# Patient Record
Sex: MALE | Race: WHITE | NOT HISPANIC OR LATINO | URBAN - METROPOLITAN AREA
[De-identification: names, ages, dates, MRNs, and addresses within clinical notes are randomized per-mention and may not be internally consistent; named-entity substitution may affect disease eponyms.]

---

## 2021-05-11 ENCOUNTER — EMERGENCY (EMERGENCY)
Facility: HOSPITAL | Age: 46
LOS: 0 days | Discharge: HOME | End: 2021-05-11
Attending: EMERGENCY MEDICINE
Payer: COMMERCIAL

## 2021-05-11 VITALS
OXYGEN SATURATION: 98 % | SYSTOLIC BLOOD PRESSURE: 133 MMHG | RESPIRATION RATE: 17 BRPM | HEART RATE: 61 BPM | DIASTOLIC BLOOD PRESSURE: 82 MMHG | TEMPERATURE: 99 F

## 2021-05-11 VITALS — HEIGHT: 68 IN | WEIGHT: 184.97 LBS
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
## 2021-05-11 DIAGNOSIS — Z23 ENCOUNTER FOR IMMUNIZATION: ICD-10-CM
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
## 2021-05-11 DIAGNOSIS — S02.2XXA FRACTURE OF NASAL BONES, INITIAL ENCOUNTER FOR CLOSED FRACTURE: ICD-10-CM

## 2021-05-11 DIAGNOSIS — R20.0 ANESTHESIA OF SKIN: ICD-10-CM

## 2021-05-11 DIAGNOSIS — R20.2 PARESTHESIA OF SKIN: ICD-10-CM

## 2021-05-11 PROCEDURE — 70486 CT MAXILLOFACIAL W/O DYE: CPT | Mod: 26,MA

## 2021-05-11 PROCEDURE — 90715 TDAP VACCINE 7 YRS/> IM: CPT

## 2021-05-11 PROCEDURE — 99284 EMERGENCY DEPT VISIT MOD MDM: CPT

## 2021-05-11 PROCEDURE — 70486 CT MAXILLOFACIAL W/O DYE: CPT

## 2021-05-11 PROCEDURE — 76376 3D RENDER W/INTRP POSTPROCES: CPT

## 2021-05-11 PROCEDURE — 70450 CT HEAD/BRAIN W/O DYE: CPT | Mod: 26,MA

## 2021-05-11 PROCEDURE — 90471 IMMUNIZATION ADMIN: CPT

## 2021-05-11 PROCEDURE — 70450 CT HEAD/BRAIN W/O DYE: CPT

## 2021-05-11 PROCEDURE — 99284 EMERGENCY DEPT VISIT MOD MDM: CPT | Mod: 25

## 2021-05-11 RX ORDER — TETANUS TOXOID, REDUCED DIPHTHERIA TOXOID AND ACELLULAR PERTUSSIS VACCINE, ADSORBED 5; 2.5; 8; 8; 2.5 [IU]/.5ML; [IU]/.5ML; UG/.5ML; UG/.5ML; UG/.5ML
0.5 SUSPENSION INTRAMUSCULAR ONCE
Refills: 0 | Status: COMPLETED | OUTPATIENT
Start: 2021-05-11 | End: 2021-05-11

## 2021-05-11 RX ADMIN — TETANUS TOXOID, REDUCED DIPHTHERIA TOXOID AND ACELLULAR PERTUSSIS VACCINE, ADSORBED 0.5 MILLILITER(S): 5; 2.5; 8; 8; 2.5 SUSPENSION INTRAMUSCULAR at 21:34
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
## 2021-05-11 NOTE — ED STATDOCS - EYES, MLM
clear bilaterally.  Pupils equal, round, and reactive to light. extraocular motion intact without pain.

## 2021-05-11 NOTE — ED STATDOCS - CHPI ED RELIEVING FACTORS
nothing
Alert and oriented to person, place and time, memory intact, behavior appropriate to situation, PERRL.
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
## 2021-05-11 NOTE — ED STATDOCS - ATTENDING CONTRIBUTION TO CARE
Dr. Clifford: I performed a face to face bedside interview with patient regarding history of present illness, review of symptoms and past medical history. I completed an independent physical exam.  I have discussed patient's plan of care with PA.   I agree with note as stated above, having amended the EMR as needed to reflect my findings.   This includes HISTORY OF PRESENT ILLNESS, HIV, PAST MEDICAL/SURGICAL/FAMILY/SOCIAL HISTORY, ALLERGIES AND HOME MEDICATIONS, REVIEW OF SYSTEMS, PHYSICAL EXAM, and any PROGRESS NOTES during the time I functioned as the attending physician for this patient.

## 2021-05-11 NOTE — ED STATDOCS - PATIENT PORTAL LINK FT
You can access the FollowMyHealth Patient Portal offered by [facility] by registering at the following website: [email or web address]. By joining [facility]’s FollowMyHealth portal, you will also be able to view your health information using other applications (apps) compatible with our system.

## 2021-05-11 NOTE — ED STATDOCS - NSFOLLOWUPINSTRUCTIONS_ED_ALL_ED_FT
Nasal Fracture    WHAT YOU NEED TO KNOW:    A nasal fracture is a crack or break in your nose. You may have a break in the upper nose (bridge), the side, or the septum. The septum is in the middle of the nose and divides your nostrils.    DISCHARGE INSTRUCTIONS:    Seek care immediately if:   •You feel like one or both of your nasal passages are blocked and you have trouble breathing.      •Clear fluid is leaking from your nose.      •You have severe nose pain, even after you take medicine.      •You have double vision or have problems moving your eyes.      Call your doctor if:   •You have a fever.      •You continue to have nosebleeds.      •You have a headache that gets worse, even after you take pain medicine.      •Your splint or packing is loose.      •You have questions or concerns about your condition or care.      Medicines:   •Medicine may be given to decrease pain or help prevent a bacterial infection. Ask how to take pain medicine safely. Medicine may also be given to decrease nasal swelling and help make breathing easier.      •Take your medicine as directed. Contact your healthcare provider if you think your medicine is not helping or if you have side effects. Tell him or her if you are allergic to any medicine. Keep a list of the medicines, vitamins, and herbs you take. Include the amounts, and when and why you take them. Bring the list or the pill bottles to follow-up visits. Carry your medicine list with you in case of an emergency.      Wound care: Ask your healthcare provider how to care for your wounds, splint, or packing.    How to care for your nasal fracture:   •Apply ice on your nose for 15 to 20 minutes every hour or as directed. Use an ice pack, or put crushed ice in a plastic bag. Cover it with a towel. Ice helps prevent tissue damage and decreases swelling and pain.      •Elevate your head when you lie down. This will help decrease swelling and pain. You may need to see a specialist 3 to 5 days later for tests or more treatment after swelling has gone down.      •Protect your nose to prevent bleeding, bruising, or another fracture. Try not to bump your nose on anything. You may not be able to play sports for up to 6 weeks.      Follow up with a specialist or your doctor in 2 to 5 days as directed: Write down any questions you have so you remember to ask them during your visits. Sometimes follow-up care is needed months or even years later to correct problems.       © Copyright [employer] 2021          FOLLOW UP WITH A PLASTIC SURGEON OR AN ENT (EAR NOSE AND THROAT DOCTOR) THIS WEEK. CALL THE OFFICE TO MAKE AN APPOINTMENT. RETURN TO ER FOR ANY WORSENING SYMPTOMS OR NEW CONCERNS.
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
## 2021-05-11 NOTE — ED STATDOCS - CARE PROVIDER_API CALL
David Sweeney)  Otolaryngology  [address] [place] [address]  Phone: [phone]  Fax: [phone]  Follow Up Time:     Cinthya Marshall)  Plastic Surgery  [address] [place] [address]  Phone: [phone]  Fax: [phone]  Follow Up Time:
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
## 2024-12-06 ENCOUNTER — EMERGENCY (EMERGENCY)
Facility: HOSPITAL | Age: 49
LOS: 0 days | Discharge: HOME | End: 2024-12-06
Attending: EMERGENCY MEDICINE
Payer: COMMERCIAL

## 2024-12-06 VITALS
WEIGHT: 192.68 LBS | DIASTOLIC BLOOD PRESSURE: 72 MMHG | RESPIRATION RATE: 16 BRPM | TEMPERATURE: 98 F | SYSTOLIC BLOOD PRESSURE: 115 MMHG | HEART RATE: 57 BPM | OXYGEN SATURATION: 99 %
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
## 2024-12-06 DIAGNOSIS — S09.90XA UNSPECIFIED INJURY OF HEAD, INITIAL ENCOUNTER: ICD-10-CM

## 2024-12-06 DIAGNOSIS — M54.2 CERVICALGIA: ICD-10-CM

## 2024-12-06 PROCEDURE — 99283 EMERGENCY DEPT VISIT LOW MDM: CPT
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
## 2024-12-06 NOTE — ED STATDOCS - ATTENDING APP SHARED VISIT CONTRIBUTION OF CARE
I, Bang Ibarra, performed the initial face to face bedside interview with this patient regarding history of present illness, review of symptoms and relevant past medical, social and family history.  I completed an independent physical examination.  I was the initial provider who evaluated this patient. I have signed out the follow up of any pending tests (i.e. labs, radiological studies) to the ACP.  I have communicated the patient’s plan of care and disposition with the ACP.  The history, relevant review of systems, past medical and surgical history, medical decision making, and physical examination was documented by the scribe in my presence and I attest to the accuracy of the documentation.

## 2024-12-06 NOTE — ED STATDOCS - PATIENT PORTAL LINK FT
You can access the FollowMyHealth Patient Portal offered by [facility] by registering at the following website: [email or web address]. By joining [facility]’s FollowMyHealth portal, you will also be able to view your health information using other applications (apps) compatible with our system.

## 2024-12-06 NOTE — ED STATDOCS - NSFOLLOWUPINSTRUCTIONS_ED_ALL_ED_FT
Follow up with the concussion clinic: [phone]    Please use 650mg tylenol (or acetaminophen) every 6 hours and 600mg motrin (or advil or ibuprofen) every 6 hours as needed for pain/discomfort/swelling.  Make sure not to use more than 3500mg in any 24 hour period.     Any worsening of symptoms or new concerning symptoms return to the ED

## 2024-12-06 NOTE — ED STATDOCS - PHYSICAL EXAMINATION
CONSTITUTIONAL: Well appearing, awake, alert, oriented to person, place, time/situation and in no apparent distress.  · ENMT: Airway patent, Nasal mucosa clear. Mouth with normal mucosa. Throat has no vesicles, no oropharyngeal exudates and uvula is midline.  · EYES: Clear bilaterally, pupils equal, round and reactive to light.  · CARDIAC: Normal rate, regular rhythm.  Heart sounds S1, S2.  No murmurs, rubs or gallops.  · RESPIRATORY: Breath sounds clear and equal bilaterally.  · GASTROINTESTINAL: Abdomen soft, non-tender, no guarding.  · MUSCULOSKELETAL: Spine appears normal, range of motion is not limited, B/L cervical paraspinal tenderness.  · NEUROLOGICAL: Alert and oriented, no focal deficits, no motor or sensory deficits. Ambulating with steady gait, no ataxia  · SKIN: Skin normal color for race, warm, dry and intact. No evidence of rash
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
## 2024-12-06 NOTE — ED STATDOCS - CLINICAL SUMMARY MEDICAL DECISION MAKING FREE TEXT BOX
Plan CT Head and C-spine, and reassess.     Shared decision making discussion had with patient. He declines CT at this time and will wait symptoms for now. Will discharge home with post-concussion followup.

## 2024-12-07 PROBLEM — Z78.9 OTHER SPECIFIED HEALTH STATUS: Chronic | Status: ACTIVE | Noted: 2021-05-15

## 2025-02-18 ENCOUNTER — APPOINTMENT (OUTPATIENT)
Dept: ULTRASOUND IMAGING | Facility: CLINIC | Age: 50
End: 2025-02-18
Payer: COMMERCIAL

## 2025-02-18 ENCOUNTER — OUTPATIENT (OUTPATIENT)
Dept: OUTPATIENT SERVICES | Facility: HOSPITAL | Age: 50
LOS: 1 days | End: 2025-02-18
Payer: COMMERCIAL

## 2025-02-18 DIAGNOSIS — Z00.8 ENCOUNTER FOR OTHER GENERAL EXAMINATION: ICD-10-CM

## 2025-02-18 PROCEDURE — 93975 VASCULAR STUDY: CPT

## 2025-02-18 PROCEDURE — 76870 US EXAM SCROTUM: CPT

## 2025-02-18 PROCEDURE — 76870 US EXAM SCROTUM: CPT | Mod: 26

## 2025-02-18 PROCEDURE — 76770 US EXAM ABDO BACK WALL COMP: CPT | Mod: 26

## 2025-02-18 PROCEDURE — 93975 VASCULAR STUDY: CPT | Mod: 26

## 2025-02-18 PROCEDURE — 76770 US EXAM ABDO BACK WALL COMP: CPT

## 2025-04-09 ENCOUNTER — APPOINTMENT (OUTPATIENT)
Dept: ORTHOPEDIC SURGERY | Facility: CLINIC | Age: 50
End: 2025-04-09
Payer: COMMERCIAL

## 2025-04-09 ENCOUNTER — TRANSCRIPTION ENCOUNTER (OUTPATIENT)
Age: 50
End: 2025-04-09

## 2025-04-09 ENCOUNTER — APPOINTMENT (OUTPATIENT)
Dept: MRI IMAGING | Facility: CLINIC | Age: 50
End: 2025-04-09
Payer: COMMERCIAL

## 2025-04-09 VITALS — WEIGHT: 185 LBS | HEIGHT: 68 IN | BODY MASS INDEX: 28.04 KG/M2

## 2025-04-09 DIAGNOSIS — M25.562 PAIN IN LEFT KNEE: ICD-10-CM

## 2025-04-09 DIAGNOSIS — M79.18 MYALGIA, OTHER SITE: ICD-10-CM

## 2025-04-09 DIAGNOSIS — Z78.9 OTHER SPECIFIED HEALTH STATUS: ICD-10-CM

## 2025-04-09 PROCEDURE — 73564 X-RAY EXAM KNEE 4 OR MORE: CPT | Mod: LT

## 2025-04-09 PROCEDURE — 99204 OFFICE O/P NEW MOD 45 MIN: CPT

## 2025-04-09 PROCEDURE — 73721 MRI JNT OF LWR EXTRE W/O DYE: CPT | Mod: LT

## 2025-04-09 PROCEDURE — 73560 X-RAY EXAM OF KNEE 1 OR 2: CPT | Mod: LT,59

## 2025-04-09 NOTE — DISCUSSION/SUMMARY
[de-identified] : Reviewed all X Ray images with patient today and interpretation was provided. MRI of left knee to eval [facility]. Patient will follow up after MRI.  referred by [facility]   ----------------------------------------------- Home Exercise The patient is instructed on a home exercise program.   SAMANTHA RIDDLE Acting as a Scribe for Dr. Rebecca JOHN, Samantha Riddle, attest that this documentation has been prepared under the direction and in the presence of Provider Dr. Fernandez.   Activity Modification The patient was advised to modify their activities.   Dx / Natural History The patient was advised of the diagnosis. The natural history of the pathology was explained in full to the patient in layman's terms. Several different treatment options were discussed and explained in full to the patient including the risks and benefits of both surgical and non-surgical treatments.  All questions and concerns were answered.   Pain Guide Activities The patient was advised to let pain guide the gradual advancement of activities.   RICE I explained to the patient that rest, ice, compression, and elevation would benefit them. They may return to activity after follow-up or when they no longer have any pain.   The patient's current medication management of their orthopedic diagnosis was reviewed today: (1) We discussed a comprehensive treatment plan that included possible pharmaceutical management involving the use of prescription strength medications including but not limited to options such as oral Naprosyn 500mg BID, once daily Meloxicam 15 mg, or 500-650 mg Tylenol versus over the counter oral medications and topical prescription NSAID Pennsaid vs over the counter Voltaren gel. (2) There is a moderate risk of morbidity with further treatment, especially from use of prescription strength medications and possible side effects of these medications which include upset stomach with oral medications, skin reactions to topical medications and cardiac/renal issues with long term use. (3) I recommended that the patient follow-up with their medical physician to discuss any significant specific potential issues with long term medication use such as interactions with current medications or with exacerbation of underlying medical comorbidities. (4) The benefits and risks associated with use of injectable, oral or topical, prescription and over the counter anti-inflammatory medications were discussed with the patient. The patient voiced understanding of the risks including but not limited to bleeding, stroke, kidney dysfunction, heart disease, and were referred to the black box warning label for further information.

## 2025-04-09 NOTE — DATA REVIEWED
[FreeTextEntry1] : 4/9/25 OC X-RAY Left Knee 4 views: This scan was reviewed and interpreted by Dr. Fernandez, and his findings are- grade 2 medial OA, patella johnny

## 2025-04-09 NOTE — ADDENDUM
[FreeTextEntry1] : Documented by Milad Saldaña acting as a scribe for Dr. Fernandez and Ruddy Botello PA-C on 04/09/2025 and was presence for the following sections: Physical Exam; Data Reviewed; Assessment; Discussion/Summary. All medical record entries made by the Scribe were at my, Dr. Fernandez, and Ruddy Botello, direction and personally dictated by me on 04/09/2025. I have reviewed the chart and agree that the record accurately reflects my personal performance of the history, physical exam, procedure and imaging.

## 2025-04-09 NOTE — PHYSICAL EXAM
[de-identified] : Neurologic: normal mood and affect, orientated and able to communicate Constitutional: well developed and well nourished  Left Knee: medial joint line tenderness +Medial Guy's +Locking Full ROM Pain with full extension Medial buckling

## 2025-04-09 NOTE — HISTORY OF PRESENT ILLNESS
[de-identified] : The patient is a 49 years old RIGHT hand dominant male who presents today complaining of leg knee pain.   Date of Injury/Onset: 3/9/25 Pain:    At Rest: 0/10  With Activity:  1-2/10  Mechanism of injury: Patient states he believes he sprained his knee while skiing a month ago.  This is NOT a Work-Related Injury being treated under Worker's Compensation. This is NOT an athletic injury occurring associated with an interscholastic or organized sports team. Quality of symptoms: medial weakness, stiffness,  Improves with: rest, ice Worse with: bending, straighten fully, prolonged sitting, sit to stand Prior treatment: no Prior Imaging: no Out of work/sport: limited gym routine School/Sport/Position/Occupation: works with mortgages  Additional Information: Patient states he suspects he has a MCL tear.

## 2025-04-11 ENCOUNTER — TRANSCRIPTION ENCOUNTER (OUTPATIENT)
Age: 50
End: 2025-04-11

## 2025-04-14 ENCOUNTER — APPOINTMENT (OUTPATIENT)
Dept: ORTHOPEDIC SURGERY | Facility: CLINIC | Age: 50
End: 2025-04-14
Payer: COMMERCIAL

## 2025-04-14 DIAGNOSIS — S49.92XA UNSPECIFIED INJURY OF LEFT SHOULDER AND UPPER ARM, INITIAL ENCOUNTER: ICD-10-CM

## 2025-04-14 DIAGNOSIS — M23.92 UNSPECIFIED INTERNAL DERANGEMENT OF LEFT KNEE: ICD-10-CM

## 2025-04-14 DIAGNOSIS — S89.92XA UNSPECIFIED INJURY OF LEFT LOWER LEG, INITIAL ENCOUNTER: ICD-10-CM

## 2025-04-14 DIAGNOSIS — M25.562 PAIN IN LEFT KNEE: ICD-10-CM

## 2025-04-14 PROCEDURE — 99214 OFFICE O/P EST MOD 30 MIN: CPT

## 2025-04-14 NOTE — PHYSICAL EXAM
[de-identified] : Neurologic: normal mood and affect, orientated and able to communicate Constitutional: well developed and well nourished  Left Knee: medial joint line tenderness +Medial Guy's +Locking Full ROM Pain with full extension Medial buckling   Left Shoulder: +speeds proximal biceps tenderness +Impingement test +Neer test +Holly test +Mark sign 4-/5 Supraspinatus Strength +Yorgason's

## 2025-04-14 NOTE — DATA REVIEWED
[FreeTextEntry1] : 4/9/25 OC X-RAY Left Knee 4 views: This scan was reviewed and interpreted by Dr. Fernandez, and his findings are- grade 2 medial OA, patella johnny   04/09/25 OC MRI Left Knee: 1. Moderate to high-grade partial tear of the proximal medial collateral ligament without complete disruption. 2. Subchondral fracture of the posterior lateral femoral condyle with surrounding marrow edema, likely representing a site of direct impaction. 3. Ruptured Baker's cyst. 4. Mild chondromalacia patella. 5. No meniscal tear. Intact cruciate ligaments.

## 2025-04-14 NOTE — HISTORY OF PRESENT ILLNESS
[de-identified] : The patient is a 49 years old RIGHT hand dominant male who presents today complaining of leg knee pain.   Date of Injury/Onset: 3/9/25 Pain:    At Rest: 0/10  With Activity:  1-2/10  Mechanism of injury: Patient states he believes he sprained his knee while skiing a month ago.  This is NOT a Work-Related Injury being treated under Worker's Compensation. This is NOT an athletic injury occurring associated with an interscholastic or organized sports team. Quality of symptoms: medial weakness, stiffness,  Improves with: rest, ice Worse with: bending, straighten fully, prolonged sitting, sit to stand Treatment/Imaging/Studies Since Last Visit: MRI 	Reports Available For Review Today: MRI @ [facility] 4/9/25 Change since last visit: Patient presents to review MRI results Out of work/sport: limited gym routine School/Sport/Position/Occupation: works with mortgages  Additional Information: Patient states he suspects he has a MCL tear.

## 2025-04-14 NOTE — DISCUSSION/SUMMARY
[de-identified] :  Reviewed all MRI images with patient, interpretation was provided. - partial tear of MCL, LFC occult fx with BB Physical therapy prescribed for strengthening and stretching.- knee and shoulder shoulder is RTC tendonitis and PBT tendonitis Playmaker knee brace. Patient will follow up in 6 weeks.    **No running/jumping/sports referred by [facility]   ----------------------------------------------- Home Exercise The patient is instructed on a home exercise program.  BARRINGTON LITTLEJOHN Acting as a Scribe for Dr. Rebecca JOHN, Barrington Littlejohn, attest that this documentation has been prepared under the direction and in the presence of Provider Dann Fernandez MD.  Activity Modification The patient was advised to modify their activities.  Dx / Natural History The patient was advised of the diagnosis.  The natural history of the pathology was explained in full to the patient in layman's terms.  Several different treatment options were discussed and explained in full to the patient including the risks and benefits of both surgical and non-surgical treatments.  All questions and concerns were answered.  Pain Guide Activities The patient was advised to let pain guide the gradual advancement of activities.  RICE I explained to the patient that rest, ice, compression, and elevation would benefit them.  They may return to activity after follow-up or when they no longer have any pain.  The patient's current medication management of their orthopedic diagnosis was reviewed today: (1) We discussed a comprehensive treatment plan that included possible pharmaceutical management involving the use of prescription strength medications including but not limited to options such as oral Naprosyn 500mg BID, once daily Meloxicam 15 mg, or 500-650 mg Tylenol versus over the counter oral medications and topical prescription NSAID Pennsaid vs over the counter Voltaren gel. (2) There is a moderate risk of morbidity with further treatment, especially from use of prescription strength medications and possible side effects of these medications which include upset stomach with oral medications, skin reactions to topical medications and cardiac/renal issues with long term use. (3) I recommended that the patient follow-up with their medical physician to discuss any significant specific potential issues with long term medication use such as interactions with current medications or with exacerbation of underlying medical comorbidities. (4) The benefits and risks associated with use of injectable, oral or topical, prescription and over the counter anti-inflammatory medications were discussed with the patient. The patient voiced understanding of the risks including but not limited to bleeding, stroke, kidney dysfunction, heart disease, and were referred to the black box warning label for further information.

## 2025-05-28 ENCOUNTER — RESULT REVIEW (OUTPATIENT)
Age: 50
End: 2025-05-28

## 2025-05-28 ENCOUNTER — APPOINTMENT (OUTPATIENT)
Dept: ORTHOPEDIC SURGERY | Facility: CLINIC | Age: 50
End: 2025-05-28
Payer: COMMERCIAL

## 2025-05-28 DIAGNOSIS — M79.18 MYALGIA, OTHER SITE: ICD-10-CM

## 2025-05-28 PROCEDURE — 99214 OFFICE O/P EST MOD 30 MIN: CPT

## 2025-05-28 PROCEDURE — 73030 X-RAY EXAM OF SHOULDER: CPT | Mod: LT

## 2025-05-28 NOTE — HISTORY OF PRESENT ILLNESS
[de-identified] : The patient is a 49 years old RIGHT hand dominant male who presents today complaining of leg knee pain.   Date of Injury/Onset: 3/9/25 Pain:    At Rest: 0/10  With Activity:  1-2/10  Mechanism of injury: Patient states he believes he sprained his knee while skiing a month ago.  This is NOT a Work-Related Injury being treated under Worker's Compensation. This is NOT an athletic injury occurring associated with an interscholastic or organized sports team. Quality of symptoms: medial weakness, stiffness,  Improves with: rest, ice Worse with: bending, straighten fully, prolonged sitting, sit to stand Treatment/Imaging/Studies Since Last Visit: PT @ [facility]-finished formal treatment for knee 	Reports Available For Review Today: none Change since last visit: Reports 90% resolution to knee pain. only complaint of pain is at end range forced flexion like kneeling or sitting on heels. Reports mild dec in pain in shoulder pain since starting PT for that. Still complains of left shoulder weakness in overhead strength and pain with pushing weight overhead anteriorly. Also feels discomfort with flys, rows and pushups. Out of work/sport: limited gym routine School/Sport/Position/Occupation: works with mortgages  Additional Information:

## 2025-05-28 NOTE — DISCUSSION/SUMMARY
[de-identified] : Reviewed all X Ray images with patient today and interpretation was provided. STAT MRI of left shoulder to eval posterior labral tear. Patient deferred MRA due to contrast injection. Reviewed all MRI images with patient today and interpretation was provided.     ----------------------------------------------- Home Exercise The patient is instructed on a home exercise program.   SAMANTHA RIDDLE Acting as a Scribe for Dr. Rebecca JOHN, Samantha Riddle, attest that this documentation has been prepared under the direction and in the presence of Provider Dr. Fernandez.   Activity Modification The patient was advised to modify their activities.   Dx / Natural History The patient was advised of the diagnosis. The natural history of the pathology was explained in full to the patient in layman's terms. Several different treatment options were discussed and explained in full to the patient including the risks and benefits of both surgical and non-surgical treatments.  All questions and concerns were answered.   Pain Guide Activities The patient was advised to let pain guide the gradual advancement of activities.   DIANE JOHN explained to the patient that rest, ice, compression, and elevation would benefit them. They may return to activity after follow-up or when they no longer have any pain.   The patient's current medication management of their orthopedic diagnosis was reviewed today: (1) We discussed a comprehensive treatment plan that included possible pharmaceutical management involving the use of prescription strength medications including but not limited to options such as oral Naprosyn 500mg BID, once daily Meloxicam 15 mg, or 500-650 mg Tylenol versus over the counter oral medications and topical prescription NSAID Pennsaid vs over the counter Voltaren gel. (2) There is a moderate risk of morbidity with further treatment, especially from use of prescription strength medications and possible side effects of these medications which include upset stomach with oral medications, skin reactions to topical medications and cardiac/renal issues with long term use. (3) I recommended that the patient follow-up with their medical physician to discuss any significant specific potential issues with long term medication use such as interactions with current medications or with exacerbation of underlying medical comorbidities. (4) The benefits and risks associated with use of injectable, oral or topical, prescription and over the counter anti-inflammatory medications were discussed with the patient. The patient voiced understanding of the risks including but not limited to bleeding, stroke, kidney dysfunction, heart disease, and were referred to the black box warning label for further information.

## 2025-05-28 NOTE — ADDENDUM
[FreeTextEntry1] : Documented by Milad Saldaña acting as a scribe for Dr. Fernandez and Ruddy Botello PA-C on 05/28/2025 and was presence for the following sections: Physical Exam; Data Reviewed; Assessment; Discussion/Summary. All medical record entries made by the Scribe were at my, Dr. Fernandez, and Ruddy Botello, direction and personally dictated by me on 05/28/2025. I have reviewed the chart and agree that the record accurately reflects my personal performance of the history, physical exam, procedure and imaging.

## 2025-06-04 ENCOUNTER — APPOINTMENT (OUTPATIENT)
Dept: ORTHOPEDIC SURGERY | Facility: CLINIC | Age: 50
End: 2025-06-04
Payer: COMMERCIAL

## 2025-06-04 DIAGNOSIS — M23.92 UNSPECIFIED INTERNAL DERANGEMENT OF LEFT KNEE: ICD-10-CM

## 2025-06-04 DIAGNOSIS — S89.92XA UNSPECIFIED INJURY OF LEFT LOWER LEG, INITIAL ENCOUNTER: ICD-10-CM

## 2025-06-04 DIAGNOSIS — M79.18 MYALGIA, OTHER SITE: ICD-10-CM

## 2025-06-04 DIAGNOSIS — M25.562 PAIN IN LEFT KNEE: ICD-10-CM

## 2025-06-04 PROCEDURE — 99214 OFFICE O/P EST MOD 30 MIN: CPT

## 2025-06-08 NOTE — HISTORY OF PRESENT ILLNESS
[de-identified] : The patient is a 49 years old RIGHT hand dominant male who presents today complaining of leg knee pain.   Date of Injury/Onset: 3/9/25 Pain:    At Rest: 0/10  With Activity:  1-2/10  Mechanism of injury: Patient states he believes he sprained his knee while skiing a month ago.  This is NOT a Work-Related Injury being treated under Worker's Compensation. This is NOT an athletic injury occurring associated with an interscholastic or organized sports team. Quality of symptoms: medial weakness, stiffness,  Improves with: rest, ice Worse with: bending, straighten fully, prolonged sitting, sit to stand Treatment/Imaging/Studies Since Last Visit: PT @ [facility]-finished formal treatment for knee 	Reports Available For Review Today: MRI @ [facility] 4/9/25 Change since last visit: no changes since last visit.  Out of work/sport: limited gym routine School/Sport/Position/Occupation: works with mortgages  Additional Information:

## 2025-06-08 NOTE — ADDENDUM
[FreeTextEntry1] : Documented by Milad Saldaña acting as a scribe for Dr. Fernandez and Ruddy Botello PA-C on 06/04/2025 and was presence for the following sections: Physical Exam; Data Reviewed; Assessment; Discussion/Summary. All medical record entries made by the Scribe were at my, Dr. Fernandez, and Ruddy Boetllo, direction and personally dictated by me on 06/04/2025. I have reviewed the chart and agree that the record accurately reflects my personal performance of the history, physical exam, procedure and imaging.

## 2025-06-08 NOTE — HISTORY OF PRESENT ILLNESS
[de-identified] : The patient is a 49 years old RIGHT hand dominant male who presents today complaining of leg knee pain.   Date of Injury/Onset: 3/9/25 Pain:    At Rest: 0/10  With Activity:  1-2/10  Mechanism of injury: Patient states he believes he sprained his knee while skiing a month ago.  This is NOT a Work-Related Injury being treated under Worker's Compensation. This is NOT an athletic injury occurring associated with an interscholastic or organized sports team. Quality of symptoms: medial weakness, stiffness,  Improves with: rest, ice Worse with: bending, straighten fully, prolonged sitting, sit to stand Treatment/Imaging/Studies Since Last Visit: PT @ [facility]-finished formal treatment for knee 	Reports Available For Review Today: MRI @ [facility] 4/9/25 Change since last visit: no changes since last visit.  Out of work/sport: limited gym routine School/Sport/Position/Occupation: works with mortgages  Additional Information:

## 2025-06-08 NOTE — ADDENDUM
[FreeTextEntry1] : Documented by Milad Saldaña acting as a scribe for Dr. Fernandez and Ruddy Botello PA-C on 06/04/2025 and was presence for the following sections: Physical Exam; Data Reviewed; Assessment; Discussion/Summary. All medical record entries made by the Scribe were at my, Dr. Fernandez, and Ruddy Botello, direction and personally dictated by me on 06/04/2025. I have reviewed the chart and agree that the record accurately reflects my personal performance of the history, physical exam, procedure and imaging.

## 2025-06-08 NOTE — PHYSICAL EXAM
[de-identified] : Neurologic: normal mood and affect, orientated and able to communicate Constitutional: well developed and well nourished  Left Knee: Full ROM Ligamental stable Nontender no effusion Neurovascularly intact distally  Left Shoulder: proximal biceps tenderness +Speed's +Yergason's posterior shoulder tenderness +[place]'s +posterior load and shift +posterior crank test posterior apprehension Supine scapular stabilized internal rotation is  degrees

## 2025-06-08 NOTE — DATA REVIEWED
[FreeTextEntry1] : 5/28/25 [facility] MRI Left Shoulder: This scan was reviewed and interpreted by Dr. Fernandez, and his findings are- IMPRESSION: Nondisplaced tearing of the posterior and posterior superior aspects of the glenoid labrum.  Supraspinatus tendinosis with mild fraying of the articular surface. No evidence for high-grade rotator cuff tendon tears.  Thickening of the joint capsule at the level of the axillary recess. This finding may be secondary to a nonacute injury or the presence of an element of adhesive capsulitis.  5/28/25 [facility] X-RAY Left Shoulder 3 views: This scan was reviewed and interpreted by Dr. Fernandez, and his findings are- unremarkable  04/09/25 [facility] MRI Left Knee: 1. Moderate to high-grade partial tear of the proximal medial collateral ligament without complete disruption. 2. Subchondral fracture of the posterior lateral femoral condyle with surrounding marrow edema, likely representing a site of direct impaction. 3. Ruptured Baker's cyst. 4. Mild chondromalacia patella. 5. No meniscal tear. Intact cruciate ligaments.  4/9/25 [facility] X-RAY Left Knee 4 views: This scan was reviewed and interpreted by Dr. Fernandez, and his findings are- grade 2 medial OA, patella johnny

## 2025-06-08 NOTE — DISCUSSION/SUMMARY
[de-identified] : Reviewed all MRI images with patient today and interpretation was provided. Patient will follow up as needed.     ----------------------------------------------- Home Exercise The patient is instructed on a home exercise program.   SAMANTHA RIDDLE Acting as a Scribe for Dr. Rebecca JOHN, Samantha Riddle, attest that this documentation has been prepared under the direction and in the presence of Provider Dr. Fernandez.   Activity Modification The patient was advised to modify their activities.   Dx / Natural History The patient was advised of the diagnosis. The natural history of the pathology was explained in full to the patient in layman's terms. Several different treatment options were discussed and explained in full to the patient including the risks and benefits of both surgical and non-surgical treatments.  All questions and concerns were answered.   Pain Guide Activities The patient was advised to let pain guide the gradual advancement of activities.   RICE I explained to the patient that rest, ice, compression, and elevation would benefit them. They may return to activity after follow-up or when they no longer have any pain.   The patient's current medication management of their orthopedic diagnosis was reviewed today: (1) We discussed a comprehensive treatment plan that included possible pharmaceutical management involving the use of prescription strength medications including but not limited to options such as oral Naprosyn 500mg BID, once daily Meloxicam 15 mg, or 500-650 mg Tylenol versus over the counter oral medications and topical prescription NSAID Pennsaid vs over the counter Voltaren gel. (2) There is a moderate risk of morbidity with further treatment, especially from use of prescription strength medications and possible side effects of these medications which include upset stomach with oral medications, skin reactions to topical medications and cardiac/renal issues with long term use. (3) I recommended that the patient follow-up with their medical physician to discuss any significant specific potential issues with long term medication use such as interactions with current medications or with exacerbation of underlying medical comorbidities. (4) The benefits and risks associated with use of injectable, oral or topical, prescription and over the counter anti-inflammatory medications were discussed with the patient. The patient voiced understanding of the risks including but not limited to bleeding, stroke, kidney dysfunction, heart disease, and were referred to the black box warning label for further information.

## 2025-06-08 NOTE — DISCUSSION/SUMMARY
[de-identified] : Reviewed all MRI images with patient today and interpretation was provided. Patient will follow up as needed.     ----------------------------------------------- Home Exercise The patient is instructed on a home exercise program.   SAMANTHA RIDDLE Acting as a Scribe for Dr. Rebecca JOHN, Samantha Riddle, attest that this documentation has been prepared under the direction and in the presence of Provider Dr. Fernandez.   Activity Modification The patient was advised to modify their activities.   Dx / Natural History The patient was advised of the diagnosis. The natural history of the pathology was explained in full to the patient in layman's terms. Several different treatment options were discussed and explained in full to the patient including the risks and benefits of both surgical and non-surgical treatments.  All questions and concerns were answered.   Pain Guide Activities The patient was advised to let pain guide the gradual advancement of activities.   RICE I explained to the patient that rest, ice, compression, and elevation would benefit them. They may return to activity after follow-up or when they no longer have any pain.   The patient's current medication management of their orthopedic diagnosis was reviewed today: (1) We discussed a comprehensive treatment plan that included possible pharmaceutical management involving the use of prescription strength medications including but not limited to options such as oral Naprosyn 500mg BID, once daily Meloxicam 15 mg, or 500-650 mg Tylenol versus over the counter oral medications and topical prescription NSAID Pennsaid vs over the counter Voltaren gel. (2) There is a moderate risk of morbidity with further treatment, especially from use of prescription strength medications and possible side effects of these medications which include upset stomach with oral medications, skin reactions to topical medications and cardiac/renal issues with long term use. (3) I recommended that the patient follow-up with their medical physician to discuss any significant specific potential issues with long term medication use such as interactions with current medications or with exacerbation of underlying medical comorbidities. (4) The benefits and risks associated with use of injectable, oral or topical, prescription and over the counter anti-inflammatory medications were discussed with the patient. The patient voiced understanding of the risks including but not limited to bleeding, stroke, kidney dysfunction, heart disease, and were referred to the black box warning label for further information.

## 2025-06-08 NOTE — PHYSICAL EXAM
[de-identified] : Neurologic: normal mood and affect, orientated and able to communicate Constitutional: well developed and well nourished  Left Knee: Full ROM Ligamental stable Nontender no effusion Neurovascularly intact distally  Left Shoulder: proximal biceps tenderness +Speed's +Yergason's posterior shoulder tenderness +[place]'s +posterior load and shift +posterior crank test posterior apprehension Supine scapular stabilized internal rotation is  degrees